# Patient Record
Sex: FEMALE | Race: OTHER | HISPANIC OR LATINO | Employment: OTHER | URBAN - METROPOLITAN AREA
[De-identification: names, ages, dates, MRNs, and addresses within clinical notes are randomized per-mention and may not be internally consistent; named-entity substitution may affect disease eponyms.]

---

## 2024-09-10 ENCOUNTER — HOSPITAL ENCOUNTER (OUTPATIENT)
Dept: RADIOLOGY | Facility: MEDICAL CENTER | Age: 64
End: 2024-09-10
Attending: STUDENT IN AN ORGANIZED HEALTH CARE EDUCATION/TRAINING PROGRAM
Payer: MEDICAID

## 2024-09-10 DIAGNOSIS — M17.9 OSTEOARTHRITIS OF KNEE, UNSPECIFIED: ICD-10-CM

## 2024-09-10 PROCEDURE — 73700 CT LOWER EXTREMITY W/O DYE: CPT | Mod: LT

## 2024-09-30 ENCOUNTER — APPOINTMENT (OUTPATIENT)
Dept: ADMISSIONS | Facility: MEDICAL CENTER | Age: 64
End: 2024-09-30
Attending: STUDENT IN AN ORGANIZED HEALTH CARE EDUCATION/TRAINING PROGRAM
Payer: MEDICAID

## 2024-10-15 ENCOUNTER — PRE-ADMISSION TESTING (OUTPATIENT)
Dept: ADMISSIONS | Facility: MEDICAL CENTER | Age: 64
End: 2024-10-15
Attending: STUDENT IN AN ORGANIZED HEALTH CARE EDUCATION/TRAINING PROGRAM
Payer: MEDICAID

## 2024-10-15 VITALS — HEIGHT: 62 IN

## 2024-10-15 DIAGNOSIS — Z01.812 PRE-OPERATIVE LABORATORY EXAMINATION: ICD-10-CM

## 2024-10-15 DIAGNOSIS — Z01.810 PRE-OPERATIVE CARDIOVASCULAR EXAMINATION: ICD-10-CM

## 2024-10-15 LAB
EKG IMPRESSION: NORMAL
ERYTHROCYTE [DISTWIDTH] IN BLOOD BY AUTOMATED COUNT: 45.5 FL (ref 35.9–50)
HCT VFR BLD AUTO: 41.6 % (ref 37–47)
HGB BLD-MCNC: 14.6 G/DL (ref 12–16)
MCH RBC QN AUTO: 33 PG (ref 27–33)
MCHC RBC AUTO-ENTMCNC: 35.1 G/DL (ref 32.2–35.5)
MCV RBC AUTO: 93.9 FL (ref 81.4–97.8)
PLATELET # BLD AUTO: 192 K/UL (ref 164–446)
PMV BLD AUTO: 11.5 FL (ref 9–12.9)
RBC # BLD AUTO: 4.43 M/UL (ref 4.2–5.4)
SCCMEC + MECA PNL NOSE NAA+PROBE: NEGATIVE
SCCMEC + MECA PNL NOSE NAA+PROBE: NEGATIVE
WBC # BLD AUTO: 6.5 K/UL (ref 4.8–10.8)

## 2024-10-15 PROCEDURE — 93005 ELECTROCARDIOGRAM TRACING: CPT

## 2024-10-15 PROCEDURE — 93010 ELECTROCARDIOGRAM REPORT: CPT | Performed by: INTERNAL MEDICINE

## 2024-10-15 PROCEDURE — 36415 COLL VENOUS BLD VENIPUNCTURE: CPT

## 2024-10-15 PROCEDURE — 87640 STAPH A DNA AMP PROBE: CPT | Mod: XU

## 2024-10-15 PROCEDURE — 87641 MR-STAPH DNA AMP PROBE: CPT

## 2024-10-15 PROCEDURE — 85027 COMPLETE CBC AUTOMATED: CPT

## 2024-10-15 RX ORDER — FUROSEMIDE 20 MG/1
20 TABLET ORAL DAILY
COMMUNITY
Start: 2023-08-01

## 2024-10-15 RX ORDER — ACETAMINOPHEN 500 MG
500-1000 TABLET ORAL EVERY 6 HOURS PRN
COMMUNITY

## 2024-10-15 RX ORDER — TADALAFIL 20 MG/1
40 TABLET ORAL PRN
COMMUNITY
Start: 2023-11-13

## 2024-10-15 RX ORDER — SPIRONOLACTONE 50 MG/1
50 TABLET, FILM COATED ORAL DAILY
COMMUNITY

## 2024-10-15 RX ORDER — LOSARTAN POTASSIUM 25 MG/1
12.5 TABLET ORAL DAILY
COMMUNITY
Start: 2023-03-09

## 2024-10-15 RX ORDER — PANTOPRAZOLE SODIUM 40 MG/1
40 TABLET, DELAYED RELEASE ORAL
COMMUNITY

## 2024-10-15 RX ORDER — ROSUVASTATIN CALCIUM 5 MG/1
5 TABLET, COATED ORAL DAILY
COMMUNITY
Start: 2023-03-09

## 2024-10-15 RX ORDER — MACITENTAN 10 MG/1
10 TABLET, FILM COATED ORAL DAILY
COMMUNITY
Start: 2023-12-28

## 2024-10-28 ENCOUNTER — ANESTHESIA EVENT (OUTPATIENT)
Dept: SURGERY | Facility: MEDICAL CENTER | Age: 64
End: 2024-10-28
Payer: MEDICAID

## 2024-10-28 ENCOUNTER — ANESTHESIA (OUTPATIENT)
Dept: SURGERY | Facility: MEDICAL CENTER | Age: 64
End: 2024-10-28
Payer: MEDICAID

## 2024-10-28 ENCOUNTER — HOSPITAL ENCOUNTER (OUTPATIENT)
Facility: MEDICAL CENTER | Age: 64
End: 2024-10-29
Attending: STUDENT IN AN ORGANIZED HEALTH CARE EDUCATION/TRAINING PROGRAM | Admitting: STUDENT IN AN ORGANIZED HEALTH CARE EDUCATION/TRAINING PROGRAM
Payer: MEDICAID

## 2024-10-28 ENCOUNTER — APPOINTMENT (OUTPATIENT)
Dept: RADIOLOGY | Facility: MEDICAL CENTER | Age: 64
End: 2024-10-28
Attending: STUDENT IN AN ORGANIZED HEALTH CARE EDUCATION/TRAINING PROGRAM
Payer: MEDICAID

## 2024-10-28 DIAGNOSIS — Z96.652 HISTORY OF TOTAL KNEE ARTHROPLASTY, LEFT: ICD-10-CM

## 2024-10-28 PROCEDURE — C1713 ANCHOR/SCREW BN/BN,TIS/BN: HCPCS | Performed by: STUDENT IN AN ORGANIZED HEALTH CARE EDUCATION/TRAINING PROGRAM

## 2024-10-28 PROCEDURE — C1776 JOINT DEVICE (IMPLANTABLE): HCPCS | Performed by: STUDENT IN AN ORGANIZED HEALTH CARE EDUCATION/TRAINING PROGRAM

## 2024-10-28 PROCEDURE — 502000 HCHG MISC OR IMPLANTS RC 0278: Performed by: STUDENT IN AN ORGANIZED HEALTH CARE EDUCATION/TRAINING PROGRAM

## 2024-10-28 PROCEDURE — 700105 HCHG RX REV CODE 258: Performed by: STUDENT IN AN ORGANIZED HEALTH CARE EDUCATION/TRAINING PROGRAM

## 2024-10-28 PROCEDURE — 700101 HCHG RX REV CODE 250: Performed by: STUDENT IN AN ORGANIZED HEALTH CARE EDUCATION/TRAINING PROGRAM

## 2024-10-28 PROCEDURE — 97163 PT EVAL HIGH COMPLEX 45 MIN: CPT

## 2024-10-28 PROCEDURE — 700102 HCHG RX REV CODE 250 W/ 637 OVERRIDE(OP): Performed by: STUDENT IN AN ORGANIZED HEALTH CARE EDUCATION/TRAINING PROGRAM

## 2024-10-28 PROCEDURE — 700111 HCHG RX REV CODE 636 W/ 250 OVERRIDE (IP): Performed by: STUDENT IN AN ORGANIZED HEALTH CARE EDUCATION/TRAINING PROGRAM

## 2024-10-28 PROCEDURE — 64447 NJX AA&/STRD FEMORAL NRV IMG: CPT | Performed by: STUDENT IN AN ORGANIZED HEALTH CARE EDUCATION/TRAINING PROGRAM

## 2024-10-28 PROCEDURE — 160035 HCHG PACU - 1ST 60 MINS PHASE I: Performed by: STUDENT IN AN ORGANIZED HEALTH CARE EDUCATION/TRAINING PROGRAM

## 2024-10-28 PROCEDURE — 700111 HCHG RX REV CODE 636 W/ 250 OVERRIDE (IP): Mod: JZ | Performed by: STUDENT IN AN ORGANIZED HEALTH CARE EDUCATION/TRAINING PROGRAM

## 2024-10-28 PROCEDURE — 160002 HCHG RECOVERY MINUTES (STAT): Performed by: STUDENT IN AN ORGANIZED HEALTH CARE EDUCATION/TRAINING PROGRAM

## 2024-10-28 PROCEDURE — 73560 X-RAY EXAM OF KNEE 1 OR 2: CPT | Mod: LT

## 2024-10-28 PROCEDURE — 97535 SELF CARE MNGMENT TRAINING: CPT

## 2024-10-28 PROCEDURE — A9270 NON-COVERED ITEM OR SERVICE: HCPCS | Performed by: STUDENT IN AN ORGANIZED HEALTH CARE EDUCATION/TRAINING PROGRAM

## 2024-10-28 PROCEDURE — 770030 HCHG ROOM/CARE - EXTENDED RECOVERY EACH 15 MIN

## 2024-10-28 PROCEDURE — 160009 HCHG ANES TIME/MIN: Performed by: STUDENT IN AN ORGANIZED HEALTH CARE EDUCATION/TRAINING PROGRAM

## 2024-10-28 PROCEDURE — 94760 N-INVAS EAR/PLS OXIMETRY 1: CPT

## 2024-10-28 PROCEDURE — 502714 HCHG ROBOTIC SURGERY SERVICES: Performed by: STUDENT IN AN ORGANIZED HEALTH CARE EDUCATION/TRAINING PROGRAM

## 2024-10-28 PROCEDURE — 160031 HCHG SURGERY MINUTES - 1ST 30 MINS LEVEL 5: Performed by: STUDENT IN AN ORGANIZED HEALTH CARE EDUCATION/TRAINING PROGRAM

## 2024-10-28 PROCEDURE — 160036 HCHG PACU - EA ADDL 30 MINS PHASE I: Performed by: STUDENT IN AN ORGANIZED HEALTH CARE EDUCATION/TRAINING PROGRAM

## 2024-10-28 PROCEDURE — 160048 HCHG OR STATISTICAL LEVEL 1-5: Performed by: STUDENT IN AN ORGANIZED HEALTH CARE EDUCATION/TRAINING PROGRAM

## 2024-10-28 PROCEDURE — 160042 HCHG SURGERY MINUTES - EA ADDL 1 MIN LEVEL 5: Performed by: STUDENT IN AN ORGANIZED HEALTH CARE EDUCATION/TRAINING PROGRAM

## 2024-10-28 DEVICE — COMPONENT FEMORAL TRIATHLON CRUCIATE RETAIN LEFT SIZE 3 (1EA): Type: IMPLANTABLE DEVICE | Site: KNEE | Status: FUNCTIONAL

## 2024-10-28 DEVICE — BASEPLATE TIBIAL TRIATHLON TRITANIUM SIZE 2 (1EA): Type: IMPLANTABLE DEVICE | Site: KNEE | Status: FUNCTIONAL

## 2024-10-28 DEVICE — IMPLANTABLE DEVICE: Type: IMPLANTABLE DEVICE | Site: KNEE | Status: FUNCTIONAL

## 2024-10-28 RX ORDER — MORPHINE SULFATE 0.5 MG/ML
INJECTION, SOLUTION EPIDURAL; INTRATHECAL; INTRAVENOUS
Status: DISCONTINUED | OUTPATIENT
Start: 2024-10-28 | End: 2024-10-28 | Stop reason: HOSPADM

## 2024-10-28 RX ORDER — SODIUM PHOSPHATE,MONO-DIBASIC 19G-7G/118
1 ENEMA (ML) RECTAL
Status: DISCONTINUED | OUTPATIENT
Start: 2024-10-28 | End: 2024-10-29 | Stop reason: HOSPADM

## 2024-10-28 RX ORDER — AMOXICILLIN 250 MG
1 CAPSULE ORAL NIGHTLY
Status: DISCONTINUED | OUTPATIENT
Start: 2024-10-28 | End: 2024-10-29 | Stop reason: HOSPADM

## 2024-10-28 RX ORDER — LIDOCAINE HYDROCHLORIDE 20 MG/ML
INJECTION, SOLUTION EPIDURAL; INFILTRATION; INTRACAUDAL; PERINEURAL PRN
Status: DISCONTINUED | OUTPATIENT
Start: 2024-10-28 | End: 2024-10-28 | Stop reason: SURG

## 2024-10-28 RX ORDER — LABETALOL HYDROCHLORIDE 5 MG/ML
5 INJECTION, SOLUTION INTRAVENOUS
Status: DISCONTINUED | OUTPATIENT
Start: 2024-10-28 | End: 2024-10-28 | Stop reason: HOSPADM

## 2024-10-28 RX ORDER — LIDOCAINE HYDROCHLORIDE 40 MG/ML
SOLUTION TOPICAL PRN
Status: DISCONTINUED | OUTPATIENT
Start: 2024-10-28 | End: 2024-10-28 | Stop reason: SURG

## 2024-10-28 RX ORDER — HYDROMORPHONE HYDROCHLORIDE 1 MG/ML
0.4 INJECTION, SOLUTION INTRAMUSCULAR; INTRAVENOUS; SUBCUTANEOUS
Status: DISCONTINUED | OUTPATIENT
Start: 2024-10-28 | End: 2024-10-28 | Stop reason: HOSPADM

## 2024-10-28 RX ORDER — EPINEPHRINE 1 MG/ML(1)
AMPUL (ML) INJECTION
Status: DISCONTINUED | OUTPATIENT
Start: 2024-10-28 | End: 2024-10-28 | Stop reason: HOSPADM

## 2024-10-28 RX ORDER — DEXAMETHASONE SODIUM PHOSPHATE 4 MG/ML
4 INJECTION, SOLUTION INTRA-ARTICULAR; INTRALESIONAL; INTRAMUSCULAR; INTRAVENOUS; SOFT TISSUE
Status: DISCONTINUED | OUTPATIENT
Start: 2024-10-28 | End: 2024-10-29 | Stop reason: HOSPADM

## 2024-10-28 RX ORDER — DOCUSATE SODIUM 100 MG/1
100 CAPSULE, LIQUID FILLED ORAL 2 TIMES DAILY
Status: DISCONTINUED | OUTPATIENT
Start: 2024-10-28 | End: 2024-10-29 | Stop reason: HOSPADM

## 2024-10-28 RX ORDER — BUPIVACAINE HYDROCHLORIDE AND EPINEPHRINE 2.5; 5 MG/ML; UG/ML
INJECTION, SOLUTION EPIDURAL; INFILTRATION; INTRACAUDAL; PERINEURAL
Status: COMPLETED | OUTPATIENT
Start: 2024-10-28 | End: 2024-10-28

## 2024-10-28 RX ORDER — DIPHENHYDRAMINE HYDROCHLORIDE 50 MG/ML
25 INJECTION INTRAMUSCULAR; INTRAVENOUS EVERY 6 HOURS PRN
Status: DISCONTINUED | OUTPATIENT
Start: 2024-10-28 | End: 2024-10-29 | Stop reason: HOSPADM

## 2024-10-28 RX ORDER — OXYCODONE HCL 5 MG/5 ML
5 SOLUTION, ORAL ORAL
Status: COMPLETED | OUTPATIENT
Start: 2024-10-28 | End: 2024-10-28

## 2024-10-28 RX ORDER — ACETAMINOPHEN 500 MG
1000 TABLET ORAL EVERY 6 HOURS PRN
Status: DISCONTINUED | OUTPATIENT
Start: 2024-11-02 | End: 2024-10-29 | Stop reason: HOSPADM

## 2024-10-28 RX ORDER — METOPROLOL TARTRATE 1 MG/ML
1 INJECTION, SOLUTION INTRAVENOUS
Status: DISCONTINUED | OUTPATIENT
Start: 2024-10-28 | End: 2024-10-28 | Stop reason: HOSPADM

## 2024-10-28 RX ORDER — MAGNESIUM SULFATE HEPTAHYDRATE 40 MG/ML
INJECTION, SOLUTION INTRAVENOUS PRN
Status: DISCONTINUED | OUTPATIENT
Start: 2024-10-28 | End: 2024-10-28 | Stop reason: SURG

## 2024-10-28 RX ORDER — SODIUM CHLORIDE, SODIUM LACTATE, POTASSIUM CHLORIDE, CALCIUM CHLORIDE 600; 310; 30; 20 MG/100ML; MG/100ML; MG/100ML; MG/100ML
INJECTION, SOLUTION INTRAVENOUS CONTINUOUS
Status: ACTIVE | OUTPATIENT
Start: 2024-10-28 | End: 2024-10-28

## 2024-10-28 RX ORDER — SODIUM CHLORIDE 9 MG/ML
INJECTION, SOLUTION INTRAMUSCULAR; INTRAVENOUS; SUBCUTANEOUS
Status: DISCONTINUED | OUTPATIENT
Start: 2024-10-28 | End: 2024-10-28 | Stop reason: HOSPADM

## 2024-10-28 RX ORDER — CEFAZOLIN SODIUM 1 G/3ML
INJECTION, POWDER, FOR SOLUTION INTRAMUSCULAR; INTRAVENOUS PRN
Status: DISCONTINUED | OUTPATIENT
Start: 2024-10-28 | End: 2024-10-28 | Stop reason: SURG

## 2024-10-28 RX ORDER — ACETAMINOPHEN 500 MG
1000 TABLET ORAL ONCE
Status: DISCONTINUED | OUTPATIENT
Start: 2024-10-28 | End: 2024-10-28 | Stop reason: HOSPADM

## 2024-10-28 RX ORDER — ACETAMINOPHEN 500 MG
1000 TABLET ORAL EVERY 6 HOURS
Status: DISCONTINUED | OUTPATIENT
Start: 2024-10-28 | End: 2024-10-29 | Stop reason: HOSPADM

## 2024-10-28 RX ORDER — PHENYLEPHRINE HYDROCHLORIDE 10 MG/ML
INJECTION, SOLUTION INTRAMUSCULAR; INTRAVENOUS; SUBCUTANEOUS PRN
Status: DISCONTINUED | OUTPATIENT
Start: 2024-10-28 | End: 2024-10-28 | Stop reason: SURG

## 2024-10-28 RX ORDER — ONDANSETRON 2 MG/ML
INJECTION INTRAMUSCULAR; INTRAVENOUS PRN
Status: DISCONTINUED | OUTPATIENT
Start: 2024-10-28 | End: 2024-10-28 | Stop reason: SURG

## 2024-10-28 RX ORDER — SCOLOPAMINE TRANSDERMAL SYSTEM 1 MG/1
1 PATCH, EXTENDED RELEASE TRANSDERMAL
Status: DISCONTINUED | OUTPATIENT
Start: 2024-10-28 | End: 2024-10-29 | Stop reason: HOSPADM

## 2024-10-28 RX ORDER — HYDRALAZINE HYDROCHLORIDE 20 MG/ML
5 INJECTION INTRAMUSCULAR; INTRAVENOUS
Status: DISCONTINUED | OUTPATIENT
Start: 2024-10-28 | End: 2024-10-28 | Stop reason: HOSPADM

## 2024-10-28 RX ORDER — DIPHENHYDRAMINE HYDROCHLORIDE 50 MG/ML
12.5 INJECTION INTRAMUSCULAR; INTRAVENOUS
Status: DISCONTINUED | OUTPATIENT
Start: 2024-10-28 | End: 2024-10-28 | Stop reason: HOSPADM

## 2024-10-28 RX ORDER — KETOROLAC TROMETHAMINE 30 MG/ML
INJECTION, SOLUTION INTRAMUSCULAR; INTRAVENOUS
Status: DISCONTINUED | OUTPATIENT
Start: 2024-10-28 | End: 2024-10-28 | Stop reason: HOSPADM

## 2024-10-28 RX ORDER — HALOPERIDOL 5 MG/ML
1 INJECTION INTRAMUSCULAR
Status: DISCONTINUED | OUTPATIENT
Start: 2024-10-28 | End: 2024-10-28 | Stop reason: HOSPADM

## 2024-10-28 RX ORDER — IPRATROPIUM BROMIDE AND ALBUTEROL SULFATE 2.5; .5 MG/3ML; MG/3ML
3 SOLUTION RESPIRATORY (INHALATION)
Status: DISCONTINUED | OUTPATIENT
Start: 2024-10-28 | End: 2024-10-28 | Stop reason: HOSPADM

## 2024-10-28 RX ORDER — OXYCODONE HCL 5 MG/5 ML
10 SOLUTION, ORAL ORAL
Status: COMPLETED | OUTPATIENT
Start: 2024-10-28 | End: 2024-10-28

## 2024-10-28 RX ORDER — BISACODYL 10 MG
10 SUPPOSITORY, RECTAL RECTAL
Status: DISCONTINUED | OUTPATIENT
Start: 2024-10-28 | End: 2024-10-29 | Stop reason: HOSPADM

## 2024-10-28 RX ORDER — POLYETHYLENE GLYCOL 3350 17 G/17G
1 POWDER, FOR SOLUTION ORAL 2 TIMES DAILY PRN
Status: DISCONTINUED | OUTPATIENT
Start: 2024-10-28 | End: 2024-10-29 | Stop reason: HOSPADM

## 2024-10-28 RX ORDER — AMOXICILLIN 250 MG
1 CAPSULE ORAL
Status: DISCONTINUED | OUTPATIENT
Start: 2024-10-28 | End: 2024-10-29 | Stop reason: HOSPADM

## 2024-10-28 RX ORDER — ONDANSETRON 2 MG/ML
4 INJECTION INTRAMUSCULAR; INTRAVENOUS
Status: DISCONTINUED | OUTPATIENT
Start: 2024-10-28 | End: 2024-10-28 | Stop reason: HOSPADM

## 2024-10-28 RX ORDER — HYDROMORPHONE HYDROCHLORIDE 1 MG/ML
0.2 INJECTION, SOLUTION INTRAMUSCULAR; INTRAVENOUS; SUBCUTANEOUS
Status: DISCONTINUED | OUTPATIENT
Start: 2024-10-28 | End: 2024-10-28 | Stop reason: HOSPADM

## 2024-10-28 RX ORDER — MIDAZOLAM HYDROCHLORIDE 1 MG/ML
INJECTION INTRAMUSCULAR; INTRAVENOUS PRN
Status: DISCONTINUED | OUTPATIENT
Start: 2024-10-28 | End: 2024-10-28 | Stop reason: SURG

## 2024-10-28 RX ORDER — ROPIVACAINE HYDROCHLORIDE 5 MG/ML
INJECTION, SOLUTION EPIDURAL; INFILTRATION; PERINEURAL
Status: DISCONTINUED | OUTPATIENT
Start: 2024-10-28 | End: 2024-10-28 | Stop reason: HOSPADM

## 2024-10-28 RX ORDER — DEXAMETHASONE SODIUM PHOSPHATE 4 MG/ML
INJECTION, SOLUTION INTRA-ARTICULAR; INTRALESIONAL; INTRAMUSCULAR; INTRAVENOUS; SOFT TISSUE PRN
Status: DISCONTINUED | OUTPATIENT
Start: 2024-10-28 | End: 2024-10-28 | Stop reason: SURG

## 2024-10-28 RX ORDER — HALOPERIDOL 5 MG/ML
1 INJECTION INTRAMUSCULAR EVERY 6 HOURS PRN
Status: DISCONTINUED | OUTPATIENT
Start: 2024-10-28 | End: 2024-10-29 | Stop reason: HOSPADM

## 2024-10-28 RX ORDER — ONDANSETRON 2 MG/ML
4 INJECTION INTRAMUSCULAR; INTRAVENOUS EVERY 4 HOURS PRN
Status: DISCONTINUED | OUTPATIENT
Start: 2024-10-28 | End: 2024-10-29 | Stop reason: HOSPADM

## 2024-10-28 RX ORDER — TRANEXAMIC ACID 100 MG/ML
INJECTION, SOLUTION INTRAVENOUS PRN
Status: DISCONTINUED | OUTPATIENT
Start: 2024-10-28 | End: 2024-10-28 | Stop reason: SURG

## 2024-10-28 RX ORDER — TRAMADOL HYDROCHLORIDE 50 MG/1
50 TABLET ORAL EVERY 4 HOURS PRN
Status: DISCONTINUED | OUTPATIENT
Start: 2024-10-28 | End: 2024-10-29 | Stop reason: HOSPADM

## 2024-10-28 RX ORDER — HYDROMORPHONE HYDROCHLORIDE 2 MG/ML
INJECTION, SOLUTION INTRAMUSCULAR; INTRAVENOUS; SUBCUTANEOUS PRN
Status: DISCONTINUED | OUTPATIENT
Start: 2024-10-28 | End: 2024-10-28 | Stop reason: SURG

## 2024-10-28 RX ORDER — HYDROMORPHONE HYDROCHLORIDE 1 MG/ML
0.1 INJECTION, SOLUTION INTRAMUSCULAR; INTRAVENOUS; SUBCUTANEOUS
Status: DISCONTINUED | OUTPATIENT
Start: 2024-10-28 | End: 2024-10-28 | Stop reason: HOSPADM

## 2024-10-28 RX ORDER — EPHEDRINE SULFATE 50 MG/ML
5 INJECTION, SOLUTION INTRAVENOUS
Status: DISCONTINUED | OUTPATIENT
Start: 2024-10-28 | End: 2024-10-28 | Stop reason: HOSPADM

## 2024-10-28 RX ADMIN — OXYCODONE HYDROCHLORIDE 5 MG: 5 SOLUTION ORAL at 11:32

## 2024-10-28 RX ADMIN — DOCUSATE SODIUM 100 MG: 100 CAPSULE, LIQUID FILLED ORAL at 17:39

## 2024-10-28 RX ADMIN — DEXAMETHASONE SODIUM PHOSPHATE 8 MG: 4 INJECTION INTRA-ARTICULAR; INTRALESIONAL; INTRAMUSCULAR; INTRAVENOUS; SOFT TISSUE at 09:40

## 2024-10-28 RX ADMIN — TRAMADOL HYDROCHLORIDE 50 MG: 50 TABLET ORAL at 15:15

## 2024-10-28 RX ADMIN — BUPIVACAINE HYDROCHLORIDE AND EPINEPHRINE BITARTRATE 20 ML: 2.5; .0091 INJECTION, SOLUTION EPIDURAL; INFILTRATION; INTRACAUDAL; PERINEURAL at 09:20

## 2024-10-28 RX ADMIN — PROPOFOL 50 MG: 10 INJECTION, EMULSION INTRAVENOUS at 09:38

## 2024-10-28 RX ADMIN — LIDOCAINE HYDROCHLORIDE 4 ML: 40 SOLUTION TOPICAL at 09:40

## 2024-10-28 RX ADMIN — SODIUM CHLORIDE, POTASSIUM CHLORIDE, SODIUM LACTATE AND CALCIUM CHLORIDE: 600; 310; 30; 20 INJECTION, SOLUTION INTRAVENOUS at 09:32

## 2024-10-28 RX ADMIN — MIDAZOLAM HYDROCHLORIDE 1 MG: 2 INJECTION, SOLUTION INTRAMUSCULAR; INTRAVENOUS at 09:20

## 2024-10-28 RX ADMIN — ROCURONIUM BROMIDE 50 MG: 10 INJECTION, SOLUTION INTRAVENOUS at 09:38

## 2024-10-28 RX ADMIN — HYDROMORPHONE HYDROCHLORIDE 0.4 MG: 2 INJECTION INTRAMUSCULAR; INTRAVENOUS; SUBCUTANEOUS at 10:05

## 2024-10-28 RX ADMIN — ACETAMINOPHEN 1000 MG: 325 TABLET ORAL at 23:27

## 2024-10-28 RX ADMIN — ONDANSETRON 4 MG: 2 INJECTION INTRAMUSCULAR; INTRAVENOUS at 09:40

## 2024-10-28 RX ADMIN — PHENYLEPHRINE HYDROCHLORIDE 200 MCG: 10 INJECTION INTRAVENOUS at 10:46

## 2024-10-28 RX ADMIN — ACETAMINOPHEN 1000 MG: 325 TABLET ORAL at 15:12

## 2024-10-28 RX ADMIN — PHENYLEPHRINE HYDROCHLORIDE 400 MCG: 10 INJECTION INTRAVENOUS at 10:16

## 2024-10-28 RX ADMIN — MAGNESIUM SULFATE HEPTAHYDRATE 2 G: 4 INJECTION, SOLUTION INTRAVENOUS at 10:03

## 2024-10-28 RX ADMIN — CEFAZOLIN 2 G: 2 INJECTION, POWDER, FOR SOLUTION INTRAMUSCULAR; INTRAVENOUS at 17:11

## 2024-10-28 RX ADMIN — TRANEXAMIC ACID 1000 MG: 100 INJECTION, SOLUTION INTRAVENOUS at 10:47

## 2024-10-28 RX ADMIN — FENTANYL CITRATE 25 MCG: 50 INJECTION, SOLUTION INTRAMUSCULAR; INTRAVENOUS at 11:32

## 2024-10-28 RX ADMIN — FENTANYL CITRATE 25 MCG: 50 INJECTION, SOLUTION INTRAMUSCULAR; INTRAVENOUS at 11:58

## 2024-10-28 RX ADMIN — SUGAMMADEX 200 MG: 100 INJECTION, SOLUTION INTRAVENOUS at 11:06

## 2024-10-28 RX ADMIN — FENTANYL CITRATE 50 MCG: 50 INJECTION, SOLUTION INTRAMUSCULAR; INTRAVENOUS at 09:36

## 2024-10-28 RX ADMIN — LIDOCAINE HYDROCHLORIDE 100 MG: 20 INJECTION, SOLUTION EPIDURAL; INFILTRATION; INTRACAUDAL at 09:38

## 2024-10-28 RX ADMIN — MIDAZOLAM HYDROCHLORIDE 1 MG: 2 INJECTION, SOLUTION INTRAMUSCULAR; INTRAVENOUS at 09:34

## 2024-10-28 RX ADMIN — PHENYLEPHRINE HYDROCHLORIDE 1 MCG/KG/MIN: 10 INJECTION INTRAVENOUS at 09:36

## 2024-10-28 RX ADMIN — TRANEXAMIC ACID 1000 MG: 100 INJECTION, SOLUTION INTRAVENOUS at 09:41

## 2024-10-28 RX ADMIN — SENNOSIDES AND DOCUSATE SODIUM 1 TABLET: 50; 8.6 TABLET ORAL at 21:20

## 2024-10-28 RX ADMIN — CEFAZOLIN 2 G: 1 INJECTION, POWDER, FOR SOLUTION INTRAMUSCULAR; INTRAVENOUS at 09:38

## 2024-10-28 SDOH — ECONOMIC STABILITY: TRANSPORTATION INSECURITY
IN THE PAST 12 MONTHS, HAS LACK OF RELIABLE TRANSPORTATION KEPT YOU FROM MEDICAL APPOINTMENTS, MEETINGS, WORK OR FROM GETTING THINGS NEEDED FOR DAILY LIVING?: NO

## 2024-10-28 SDOH — ECONOMIC STABILITY: TRANSPORTATION INSECURITY
IN THE PAST 12 MONTHS, HAS THE LACK OF TRANSPORTATION KEPT YOU FROM MEDICAL APPOINTMENTS OR FROM GETTING MEDICATIONS?: NO

## 2024-10-28 ASSESSMENT — SOCIAL DETERMINANTS OF HEALTH (SDOH)
WITHIN THE LAST YEAR, HAVE TO BEEN RAPED OR FORCED TO HAVE ANY KIND OF SEXUAL ACTIVITY BY YOUR PARTNER OR EX-PARTNER?: NO
IN THE PAST 12 MONTHS, HAS THE ELECTRIC, GAS, OIL, OR WATER COMPANY THREATENED TO SHUT OFF SERVICE IN YOUR HOME?: NO
WITHIN THE LAST YEAR, HAVE YOU BEEN AFRAID OF YOUR PARTNER OR EX-PARTNER?: NO
WITHIN THE LAST YEAR, HAVE YOU BEEN KICKED, HIT, SLAPPED, OR OTHERWISE PHYSICALLY HURT BY YOUR PARTNER OR EX-PARTNER?: NO
WITHIN THE LAST YEAR, HAVE YOU BEEN HUMILIATED OR EMOTIONALLY ABUSED IN OTHER WAYS BY YOUR PARTNER OR EX-PARTNER?: NO
WITHIN THE PAST 12 MONTHS, YOU WORRIED THAT YOUR FOOD WOULD RUN OUT BEFORE YOU GOT THE MONEY TO BUY MORE: NEVER TRUE
WITHIN THE PAST 12 MONTHS, THE FOOD YOU BOUGHT JUST DIDN'T LAST AND YOU DIDN'T HAVE MONEY TO GET MORE: NEVER TRUE

## 2024-10-28 ASSESSMENT — PAIN DESCRIPTION - PAIN TYPE
TYPE: SURGICAL PAIN
TYPE: CHRONIC PAIN;ACUTE PAIN
TYPE: SURGICAL PAIN
TYPE: SURGICAL PAIN

## 2024-10-28 ASSESSMENT — LIFESTYLE VARIABLES
EVER FELT BAD OR GUILTY ABOUT YOUR DRINKING: NO
HAVE YOU EVER FELT YOU SHOULD CUT DOWN ON YOUR DRINKING: NO
TOTAL SCORE: 0
AVERAGE NUMBER OF DAYS PER WEEK YOU HAVE A DRINK CONTAINING ALCOHOL: 0
DOES PATIENT WANT TO STOP DRINKING: NO
EVER HAD A DRINK FIRST THING IN THE MORNING TO STEADY YOUR NERVES TO GET RID OF A HANGOVER: NO
HOW MANY TIMES IN THE PAST YEAR HAVE YOU HAD 5 OR MORE DRINKS IN A DAY: 0
TOTAL SCORE: 0
CONSUMPTION TOTAL: NEGATIVE
ALCOHOL_USE: NO
HAVE PEOPLE ANNOYED YOU BY CRITICIZING YOUR DRINKING: NO
ON A TYPICAL DAY WHEN YOU DRINK ALCOHOL HOW MANY DRINKS DO YOU HAVE: 0
TOTAL SCORE: 0

## 2024-10-28 ASSESSMENT — COGNITIVE AND FUNCTIONAL STATUS - GENERAL
DAILY ACTIVITIY SCORE: 24
MOBILITY SCORE: 22
WALKING IN HOSPITAL ROOM: A LITTLE
CLIMB 3 TO 5 STEPS WITH RAILING: A LITTLE
SUGGESTED CMS G CODE MODIFIER MOBILITY: CJ
MOBILITY SCORE: 22
WALKING IN HOSPITAL ROOM: A LITTLE
SUGGESTED CMS G CODE MODIFIER DAILY ACTIVITY: CH
SUGGESTED CMS G CODE MODIFIER MOBILITY: CJ
CLIMB 3 TO 5 STEPS WITH RAILING: A LITTLE

## 2024-10-28 ASSESSMENT — PATIENT HEALTH QUESTIONNAIRE - PHQ9
SUM OF ALL RESPONSES TO PHQ9 QUESTIONS 1 AND 2: 0
1. LITTLE INTEREST OR PLEASURE IN DOING THINGS: NOT AT ALL
2. FEELING DOWN, DEPRESSED, IRRITABLE, OR HOPELESS: NOT AT ALL

## 2024-10-28 ASSESSMENT — GAIT ASSESSMENTS
DEVIATION: ANTALGIC
DISTANCE (FEET): 150
GAIT LEVEL OF ASSIST: SUPERVISED
ASSISTIVE DEVICE: FRONT WHEEL WALKER

## 2024-10-28 ASSESSMENT — FIBROSIS 4 INDEX
FIB4 SCORE: 2.56

## 2024-10-28 ASSESSMENT — PAIN SCALES - GENERAL: PAIN_LEVEL: 4

## 2024-10-28 NOTE — OP REPORT
OPERATIVE NOTE     DATE OF PROCEDURE: 10/28/2024           PRE-OP DIAGNOSIS:   Left knee osteoarthritis           POST-OP DIAGNOSIS: same           PROCEDURE:   Left total knee arthroplasty - robot assisted           SURGEON: Miles Pedersen M.D. - Primary           ASSISTANT: micheline wilder, certified first assist    assistant was required for critical portions of the case including patient positioning manipulation possibly graft preparation retraction suture management wound closure as well as other critical portions of the case.  I be unable to perform these portion of the case by myself there were no other certified first assist available    ANESTHESIA: General peripheral nerve block           ESTIMATED BLOOD LOSS: 50cc                  SPECIMENS: None           COMPLICATIONS: None           CONDITION: Stable           OPERATIVE INDICATIONS AND DESCRIPTION OF PROCEDURE:     Implants:  Minh triathlon press fit total knee arthroplasty system  Size 3 press-fit femur  Size 2 press-fit tibia  Size 31 press-fit patella  Size 12 poly    I met the patient in the preoperative holding area.  I had a full discussion with them regarding multiple options for the patient's condition including nonoperative management.  Again today I offered them nonoperative treatment modalities.  Regarding operative options I specifically I outlined left total knee arthroplasty. I discussed some possible complications including bleeding possibly requiring transfusion, infection, neurovascular damage, malunion, nonunion, failure of implants, failure of surgery, chronic pain, need for revision surgery, instability, limb length discrepancy, prolonged rehab, weight bearing restrictions, DVT, PE, MI, stroke and death. After going over risks and benefits making no promises either guaranteed or implied patient elected to proceed.  At this point informed consent was signed.  A surgical marking pen was used to place my initials on the patient's left  knee.    Patient was brought back to the operating room.  They were placed supine on a regular table all bony prominences padded very well to prevent neuropraxia's.  They were secured to the table with a strap.  General anesthesia was introduced.  Alvin/imp positioner was used for for positioning.  At this point a tourniquet was placed and then the lower extremity was prepped in sterile standard fashion.  At this point a timeout was performed with all parties in the room ceasing activity. Informed consent sheet was visible confirming the patient's name date of birth MRN and matched their wristband. Antibiotics were confirmed and the left lower extremity was confirmed as the correct surgical site.  My surgical initials were visible on this extremity.  At this point we were cleared to proceed with the procedure.  1 g of TXA was given just prior to incision.    Exam under anesthesia revealed flexion contracture 5 degrees full flexion    We began by creating a longitudinal incision of the anterior aspect of the knee.  Is approximately 15 cm in length.  Sharp dissection was carried down through the skin Bovie cautery was used to maintain hemostasis at all times.  This point we exposed our extensor mechanism we performed a medial parapatellar approach.  Sharp dissection was carried down we have a small cuff of tendon to repair after the arthrotomy.  The arthrotomy was carried down to bone all the way to the medial aspect of the tibial tubercle.  At this point appropriate releases were done we exposed our joint space.  A small resection of fat pad was performed and the meniscus was removed.  We released the ACL.    At this point I placed our tibial array making small stab incisions.  I placed our femoral array intra incision within the femur.  All arrays had bicortical pin placement.  Placed our 2 checkpoints.  Registered our [employer] robot.  At this point I then removed osteophytes and I made intraoperative implant  adjustments virtually to achieve appropriate balance.  Once we achieved appropriate balance the robot was brought in.  Taking great care to protect the collateral ligaments I made our distal femoral cuts then our chamfers and anterior posterior cuts on the femur.  I made our tibial cut while protecting all collateral ligaments and patellar tendon.  Bone fragments were removed.  I placed our trial components which were malleted into place.  I used the Bovie to giorgi our tibial rotation after pinning it.  I placed a trial poly which I templated for and took it through range of motion.  Demonstrated excellent full motion good stability with appropriate gapping were very happy with this construct.  I drilled our lugs punched our tibia removed our trials.  This point the wound was copiously irrigated.  I press-fit  our final implants took it through final range of motion demonstrated excellent stability motion and were very happy with this construct.  I then removed our 2 checkpoints as well as our tibial and femoral array.    I turned our attention to the patella.  Using a fan blade saw patella was cut.  Leaving over 13 mm of bone left.  It was drilled for size 31 press-fit patella.  Was irrigated patellas press-fit into place tracking normal along trochlea    This point I did a Betadine wash for 5 minutes.  At this point copious irrigation 1 to 2 L with pulse lavage was used for irrigation.  I placed our posterior capsular injection after aspirating to make sure we are not in any vascular structures.  At this point any remaining bleeders were cauterized.  1 g vancomycin was placed deep within the wound.  During closure second gram TXA was given.  Our extensor mechanism was closed using several nonabsorbable sutures and then a barbed running suture.  Our skin was closed in interrupted subcutaneous/running subcuticular fashion.  Sterile bandages were placed.  Counts were correct x2 patient was woken up from general  anesthesia taken to PACU in stable condition    POSTOPERATIVE PLAN:  Weight-bearing: Weight-bear as tolerated  DVT prophylaxis: Aspirin 81 twice daily for 6 weeks  Antibiotics: Preop as well as 24 hours postop  Showering: Okay to shower postop day 7 and remove dressing, no lotions no scrubbing's, place new dressing.  No tub soaks baths or swimming  Prescriptions: have been E prescribed  Follow-up:  in 2 weeks.  Call with any questions or concerns patient as well as his significant other have been counseled on signs of infection and to call immediately if they develop any of these  Physical Therapy: Begin within 1 week    Did call the patient's family to discuss the surgery with them.  Counseled them on signs of infection which they understood.  If they have any issues including any concerns with infection including redness drainage swelling pain they are to call our office immediately or go to the local emergency department    This is dictated with voice recognition software please excuse any errors

## 2024-10-28 NOTE — OR NURSING
1114 - Pt to PACU from OR. Report from anesthesia and OR RN. On 6L O2 via mask. Respirations even and unlabored. VSS. Pillow placed under operative lower extremity ankle only, knee of jayshree flat.    1132 - Pt medicated for pain per MAR. Pt tolerating sips of water.    1144 - Report to Cecilia AN    1158- Subsequent dose of pain med per MAR.    1214 - Given IS and instructed on use with goal of 2500, pt currently inhaling volumes of approx 2000.     1230 - Unable to wean pt off of O2.     1242 - Pt family updated on pt status by telephone.    1311 -  Report given to Chucky An GSU. All questions answered, verbalizes understanding.     1347 - Transport request placed. O2 tank half full.    1351 - Pt transported to room on 2L O2 via nasal cannula. All belongings with pt.

## 2024-10-28 NOTE — OR NURSING
1150: Report received from Niru ANAYA.     1155: Pt states pain remains 6/10, medicated per prn orders. Stable on 4L mask.     1200: Updated pt family via phone. Pt sleeping at this time. Placed on 2L NC.    1208: Pt on 3L NC. Report given to Niru ANAYA.

## 2024-10-28 NOTE — THERAPY
Physical Therapy   Initial Evaluation     Patient Name: Leeanna Bryant  Age:  63 y.o., Sex:  female  Medical Record #: [id number]  Today's Date: 10/28/2024     Precautions  Precautions: (P) Weight Bearing As Tolerated Left Lower Extremity    Assessment  Patient is 63 y.o. female who was seen s/p left TKA with WBAT orders for left lower extremity. Pt was agreeable to therapy evaluation and presented with safe upright functional mobility with AD use after therapy session. Pt was provided with education on elevation, icing, positioning, and supine/seated therapeutic exercises. Pt was able to return demo safe gait mechanics with use of AD on flat level surfaces with appropriate heel to toe gait mechanics. Pt was able to return demo safe mechanics in order to navigate stairs with use of FWW. Pt was able to demonstrate safe use of AD during transfers/transitions and general locomotion with no olvin LOB. Pt was primarily limited by lightheadedness and drop in SpO2 while on supplemental 3 L O2 down to 88%. Pt was able to recover with deep breathing and rest breaks. RN aware of concerns with SpO2. Pt has no acute skilled PT needs at this time, anticipate pt to d/c home once medically clear with recs for FWW use and OP therapy services.     The pt was provided with the following skilled therapy txt: Pt was provided with VC, demo, and facilitation during gait training in order to return demo terminal knee extension and appropriate heel strike/flexion of knee in order to return demo appropriate heel to toe gait mechanics. Pt was provided with demo and VC to go up/down steps with safe mechanics with use of AD. Pt was provided with VC and TC for appropriate quad contraction and mechanics during supine/seated therapeutic exercises. Pt was provided with education on intensity, frequency, and duration of exercises.    Plan    Physical Therapy Initial Treatment Plan   Duration: (P) Evaluation only    DC Equipment Recommendations: (P)  Front-Wheel Walker  Discharge Recommendations: (P) Recommend outpatient physical therapy services to address higher level deficits     Objective       10/28/24 6074   Initial Contact Note    Initial Contact Note Order Received and Verified, Evaluation Only - Patient Does Not Require Further Acute Physical Therapy at this Time.  However, May Benefit from Post Acute Therapy for Higher Level Functional Deficits.   Precautions   Precautions Weight Bearing As Tolerated Left Lower Extremity   Vitals   Pulse Oximetry 88 %   O2 (LPM) 3   O2 Delivery Device Nasal Cannula   Vitals Comments during ambulation drops to 88%   Pain 0 - 10 Group   Location Knee   Location Orientation Left   Description Sore   Therapist Pain Assessment Prior to Activity;During Activity;Post Activity;Nurse Notified;4   Prior Living Situation   Prior Services None   Housing / Facility 1 Story House   Steps Into Home 1   Steps In Home 0   Equipment Owned None   Lives with - Patient's Self Care Capacity Significant Other   Comments SO is supportive and can assist upon d/c to home   Prior Level of Functional Mobility   Bed Mobility Independent   Transfer Status Independent   Ambulation Independent   Ambulation Distance   (community)   Assistive Devices Used None   Stairs Independent   History of Falls   History of Falls No   Cognition    Cognition / Consciousness WDL   Passive ROM Upper Body   Passive ROM Upper Body WDL   Active ROM Upper Body   Active ROM Upper Body  WDL   Strength Upper Body   Upper Body Strength  WDL   Sensation Upper Body   Upper Extremity Sensation  WDL   Upper Body Muscle Tone   Upper Body Muscle Tone  WDL   Passive ROM Lower Body   Passive ROM Lower Body X   Comments limited due to pain, able to demo 0-90 deg of L knee ROM   Active ROM Lower Body    Active ROM Lower Body  X   Comments same as above   Strength Lower Body   Lower Body Strength  X   Comments limited due to pain, able to demo functional strength for B LE   Sensation  Lower Body   Lower Extremity Sensation   WDL   Lower Body Muscle Tone   Lower Body Muscle Tone  WDL   Neurological Concerns   Neurological Concerns No   Coordination Upper Body   Coordination WDL   Coordination Lower Body    Coordination Lower Body  WDL   Balance Assessment   Sitting Balance (Static) Good   Sitting Balance (Dynamic) Fair +   Standing Balance (Static) Good   Standing Balance (Dynamic) Fair +   Weight Shift Sitting Good   Weight Shift Standing Fair   Comments w/fww use, no olvin LOB noted   Bed Mobility    Supine to Sit Supervised   Sit to Supine Supervised   Scooting Supervised   Gait Analysis   Gait Level Of Assist Supervised   Assistive Device Front Wheel Walker   Distance (Feet) 150   # of Times Distance was Traveled 1   Deviation Antalgic   # of Stairs Climbed 2   Level of Assist with Stairs Supervised   Weight Bearing Status WBAT L LE   Functional Mobility   Sit to Stand Supervised   Bed, Chair, Wheelchair Transfer Supervised   Transfer Method Stand Step   Mobility sit<>stand, ambulation, stairs, transfer back to chair   6 Clicks Assessment - How much HELP from from another person do you currently need... (If the patient hasn't done an activity recently, how much help from another person do you think he/she would need if he/she tried?)   Turning from your back to your side while in a flat bed without using bedrails? 4   Moving from lying on your back to sitting on the side of a flat bed without using bedrails? 4   Moving to and from a bed to a chair (including a wheelchair)? 4   Standing up from a chair using your arms (e.g., wheelchair, or bedside chair)? 4   Walking in hospital room? 3   Climbing 3-5 steps with a railing? 3   6 clicks Mobility Score 22   Activity Tolerance   Sitting in Chair functional   Sitting Edge of Bed NT   Standing 10 mins   Comments reports of slight lightheadedness during ambulation, demonstrates with drop in SpO2, RN aware   Edema / Skin Assessment   Edema / Skin  Not  Assessed   Education Group   Education Provided Role of Physical Therapist;Exercises - Supine;Exercises - Seated;Gait Training;Stair Training;Use of Assistive Device   Role of Physical Therapist Patient Response Patient;Acceptance;Explanation;Demonstration;Verbal Demonstration;Action Demonstration   Gait Training Patient Response Patient;Acceptance;Explanation;Demonstration;Verbal Demonstration;Action Demonstration   Stair Training Patient Response Patient;Acceptance;Explanation;Demonstration;Verbal Demonstration;Action Demonstration   Use of Assistive Device Patient Response Patient;Acceptance;Explanation;Demonstration;Verbal Demonstration;Action Demonstration   Exercises - Supine Patient Response Patient;Acceptance;Explanation;Demonstration;Handout;Action Demonstration;Verbal Demonstration   Exercises - Seated Patient Response Patient;Acceptance;Explanation;Demonstration;Handout;Verbal Demonstration;Action Demonstration   Physical Therapy Initial Treatment Plan    Duration Evaluation only   Anticipated Discharge Equipment and Recommendations   DC Equipment Recommendations Front-Wheel Walker   Discharge Recommendations Recommend outpatient physical therapy services to address higher level deficits   Interdisciplinary Plan of Care Collaboration   IDT Collaboration with  Nursing   Patient Position at End of Therapy Seated;Call Light within Reach;Phone within Reach;Tray Table within Reach;Family / Friend in Room   Collaboration Comments aware of visit and recs   Session Information   Date / Session Number  10/28 eval only

## 2024-10-29 VITALS
BODY MASS INDEX: 22.35 KG/M2 | HEART RATE: 70 BPM | TEMPERATURE: 97 F | WEIGHT: 121.47 LBS | RESPIRATION RATE: 16 BRPM | HEIGHT: 62 IN | DIASTOLIC BLOOD PRESSURE: 61 MMHG | SYSTOLIC BLOOD PRESSURE: 101 MMHG | OXYGEN SATURATION: 93 %

## 2024-10-29 PROCEDURE — 700102 HCHG RX REV CODE 250 W/ 637 OVERRIDE(OP): Performed by: STUDENT IN AN ORGANIZED HEALTH CARE EDUCATION/TRAINING PROGRAM

## 2024-10-29 PROCEDURE — 90471 IMMUNIZATION ADMIN: CPT

## 2024-10-29 PROCEDURE — A9270 NON-COVERED ITEM OR SERVICE: HCPCS | Performed by: STUDENT IN AN ORGANIZED HEALTH CARE EDUCATION/TRAINING PROGRAM

## 2024-10-29 PROCEDURE — 90656 IIV3 VACC NO PRSV 0.5 ML IM: CPT | Performed by: STUDENT IN AN ORGANIZED HEALTH CARE EDUCATION/TRAINING PROGRAM

## 2024-10-29 PROCEDURE — 700111 HCHG RX REV CODE 636 W/ 250 OVERRIDE (IP): Mod: JZ | Performed by: STUDENT IN AN ORGANIZED HEALTH CARE EDUCATION/TRAINING PROGRAM

## 2024-10-29 PROCEDURE — 94760 N-INVAS EAR/PLS OXIMETRY 1: CPT

## 2024-10-29 PROCEDURE — 770030 HCHG ROOM/CARE - EXTENDED RECOVERY EACH 15 MIN

## 2024-10-29 RX ADMIN — ACETAMINOPHEN 1000 MG: 325 TABLET ORAL at 05:21

## 2024-10-29 RX ADMIN — DOCUSATE SODIUM 100 MG: 100 CAPSULE, LIQUID FILLED ORAL at 05:22

## 2024-10-29 RX ADMIN — ONDANSETRON 4 MG: 2 INJECTION INTRAMUSCULAR; INTRAVENOUS at 05:20

## 2024-10-29 RX ADMIN — ACETAMINOPHEN 1000 MG: 325 TABLET ORAL at 11:10

## 2024-10-29 RX ADMIN — INFLUENZA A VIRUS A/VICTORIA/4897/2022 IVR-238 (H1N1) ANTIGEN (FORMALDEHYDE INACTIVATED), INFLUENZA A VIRUS A/CALIFORNIA/122/2022 SAN-022 (H3N2) ANTIGEN (FORMALDEHYDE INACTIVATED), AND INFLUENZA B VIRUS B/MICHIGAN/01/2021 ANTIGEN (FORMALDEHYDE INACTIVATED) 0.5 ML: 15; 15; 15 INJECTION, SUSPENSION INTRAMUSCULAR at 09:05

## 2024-10-29 ASSESSMENT — PAIN DESCRIPTION - PAIN TYPE
TYPE: SURGICAL PAIN
TYPE: SURGICAL PAIN

## 2024-10-29 NOTE — PROGRESS NOTES
Received report from night shift RN.   Assumed care of pt.   Assessment and chart check complete.   Pt is AAOX4, respirations even and unlabored, no signs of distress, denies nausea/vomiting.   Upated for the POC of the day.  Landon vac dressing CDI with dorsiflex and plantar flex both feet, palpable pulses on both LE.   CMS intact.  Encouraged IS.  Fall precautions in place, treaded socks on pt, bed in low position.   Call light within reach.   Educated pt to call if needing anything.   Hourly rounding.

## 2024-10-29 NOTE — CARE PLAN
The patient is Stable - Low risk of patient condition declining or worsening    Shift Goals  Clinical Goals: Monitor BP and oxygen saturation  Patient Goals: monitor BP and sleep comfortably    Progress made toward(s) clinical / shift goals:  Patient Bp is closely monitored, and Spo2 maintained between 92 and 93 on 3L of oxygen. Patient is sleeping at this time , calm an distress .    Patient is not progressing towards the following goals:

## 2024-10-29 NOTE — PROGRESS NOTES
Bedside report received from day shift nurse. Assumed patient care at 1910, Patient is assessed, is AxOx4.Patient is on 3L of oxygen at Spo2 92.Saline locked. Patient made no complaints at this time , calm  and no distress. Bed locked and in low position. Call light and personal belongings within patient reach. Hourly rounding and ongoing nursing care continues.

## 2024-10-29 NOTE — PROGRESS NOTES
Discharging patient home per MD order. Pt demonstrated understanding of discharge instructions, follow up appointments, home medications, prescriptions, and home care for surgical wound. Pt is voiding without difficulty, pain well controlled, tolerating oral medications, O2 greater than 90%.Pt verbalized understanding of discharge instructions and educational handouts and all questions answered. PIV removed. FWW delivered at bedside. Pt discharged off unit with hospital escort.

## 2024-10-29 NOTE — DISCHARGE PLANNING
Received Choice Form at: 1147  Agency/Facility Name: [facility] Referral per Choice Form at: 1239

## 2024-10-29 NOTE — DISCHARGE PLANNING
Case Management Discharge Planning    Admission Date: 10/28/2024  GMLOS:    ALOS: 0    6-Clicks ADL Score: 24  6-Clicks Mobility Score: 22      Anticipated Discharge Dispo: Discharge Disposition: Discharged to home/self care (01)  Discharge Address: [address] [place]    DME Needed: Yes    DME Ordered: Yes    Action(s) Taken: DC Assessment Complete (See below), Choice obtained, and Referral(s) sent    LSW received DME order for FWW. Choice form completed for [facility]. Choice form faxed to [facility].     Escalations Completed: None    Medically Clear: Yes    Next Steps: LSW to follow    Barriers to Discharge: None    Is the patient up for discharge tomorrow: No          Care Transition Team Assessment    Pt lives in a one story house with her spouse.   Pt was independent prior to admission, no DME at baseline. Pt is retired.     Information Source  Orientation Level: Oriented X4  Information Given By: Patient  Who is responsible for making decisions for patient? : Patient    Readmission Evaluation  Is this a readmission?: No    Elopement Risk  Legal Hold: No  Ambulatory or Self Mobile in Wheelchair: Yes  Disoriented: No  Psychiatric Symptoms: None  History of Wandering: No  Elopement this Admit: No  Vocalizing Wanting to Leave: No  Displays Behaviors, Body Language Wanting to Leave: No-Not at Risk for Elopement  Elopement Risk: Not at Risk for Elopement    Interdisciplinary Discharge Planning  Lives with - Patient's Self Care Capacity: Significant Other  Patient or legal guardian wants to designate a caregiver: No  Support Systems: Spouse / Significant Other  Housing / Facility: 1 Story House  Prior Services: None    Discharge Preparedness  What is your plan after discharge?: Home with help  What are your discharge supports?: Spouse  Prior Functional Level: Ambulatory, Independent with Activities of Daily Living  Difficulity with ADLs: None  Difficulity with IADLs: None    Functional Assesment  Prior  Functional Level: Ambulatory, Independent with Activities of Daily Living    Finances  Financial Barriers to Discharge: No  Prescription Coverage: Yes    Vision / Hearing Impairment  Vision Impairment : Yes  Hearing Impairment : No    Advance Directive  Advance Directive?: None    Domestic Abuse  Possible Abuse/Neglect Reported to:: Not Applicable    Psychological Assessment  History of Substance Abuse: None  History of Psychiatric Problems: No  Non-compliant with Treatment: No  Newly Diagnosed Illness: No    Discharge Risks or Barriers  Discharge risks or barriers?: No    Anticipated Discharge Information  Discharge Disposition: Discharged to home/self care (01)  Discharge Address: [address]

## 2024-10-29 NOTE — CARE PLAN
The patient is Stable - Low risk of patient condition declining or worsening    Shift Goals  Clinical Goals: Manage pain at tolerable level throughout the shift, able to ambulate  Patient Goals: pain control, for discharge    Progress made toward(s) clinical / shift goals: Pain has been controlled with scheduled pain medications. Pt able to ambulate using a FWW with assist.     Patient is not progressing towards the following goals:      Problem: Post-Operative Knee Replacement  Goal: Patient will demonstrate understanding of knee replacement post-surgical weight bearing restrictions and DME usage during hospital stay and post discharge  Outcome: Progressing  Goal: Patient's neurovascular status will be maintained or improve  Outcome: Progressing  Goal: Early mobilization post surgery  Outcome: Progressing     Problem: Pain - Post Surgery  Goal: Alleviation or reduction of pain post surgery  Outcome: Progressing     Problem: Incision Care  Goal: Optimal post surgical incision care  Outcome: Progressing     Problem: Respiratory/Oxygenation Function Post-Surgical  Goal: Patient will achieve/maintain normal respiratory rate/effort  Outcome: Progressing     Problem: Venous Thromboembolism (VTE) Prevention  Goal: The patient will remain free from venous thromboembolism (VTE)  Outcome: Progressing     Problem: Discharge Barriers/Planning  Goal: Patient's continuum of care needs will be met  Outcome: Progressing

## 2024-10-29 NOTE — CARE PLAN
The patient is Stable - Low risk of patient condition declining or worsening    Shift Goals  Clinical Goals: Patient will remain free from falls throughout shift.  Patient Goals: rest/sleep comfortably.    Progress made toward(s) clinical / shift goals:  Patient remained free from falls throughout shift with fall precautions including bed in locked and lowest position, call light within reach, bed nearest nurses station.    Patient is not progressing towards the following goals:

## 2024-10-29 NOTE — PROGRESS NOTES
4 Eyes Skin Assessment Completed by Cici RN and Thomas RN.    Head WDL  Ears WDL  Nose WDL  Mouth WDL  Neck WDL  Breast/Chest WDL  Shoulder Blades WDL  Spine WDL  (R) Arm/Elbow/Hand WDL  (L) Arm/Elbow/Hand WDL  Abdomen old Scar from surgery  Groin WDL  Scrotum/Coccyx/Buttocks WDL  (R) Leg WDL  (L) Leg Scar  (R) Heel/Foot/Toe WDL  (L) Heel/Foot/Toe WDL          Devices In Places Blood Pressure Cuff, Pulse Ox, and Nasal Cannula      Interventions In Place Pillows, SCD    Possible Skin Injury No    Pictures Uploaded Into Epic N/A  Wound Consult Placed N/A  RN Wound Prevention Protocol Ordered No

## 2024-11-14 NOTE — PROGRESS NOTES
"Orthopaedic Progress Note  Admitted to floor postoperatively for left total knee arthroplasty      Reports pain controlled  No mari catheter, voiding well per nursing records  Denies fevers, chills, chest pain per nursing records          Examination:   /61   Pulse 70   Temp 36.1 °C (97 °F) (Temporal)   Resp 16   Ht 1.575 m (5' 2.01\")   Wt 55.1 kg (121 lb 7.6 oz)   SpO2 93%     No acute distress  Breathing unlabored  Dressing clean dry and intact   Motor: fires ehl, tibialis anterior, gastrocnemius  Sensation intact to light touch sural, saphenous, superficial peroneal, deep peroneal, tibial distributions  Compartments soft/compressible  Toes warm and well perfused, cap refill <2      A/P: Postop day 1 left total knee arthroplasty    DVT Prophylaxis- TEDS/SCDs, aspirin  Weight Bearing Status-as tolerated  PT/OT  Antibiotics: Preop  Discharged  Case Coordination   "

## (undated) DEVICE — DISPOSABLE WOUND VAC PICO 10 X 30 CM - WOUND CARE (3/CA)

## (undated) DEVICE — PIN FIXATION BONE STERILE 3.2MM X 140MM (2EA/PK)

## (undated) DEVICE — SUTURE 3-0 ETHILON FS-1 - (36/BX) 30 INCH

## (undated) DEVICE — BANDAGE ELASTIC DOUBLE 6X10 - (6EA/BX)"

## (undated) DEVICE — STAPLER SKIN DISP - (6/BX 10BX/CA) VISISTAT

## (undated) DEVICE — SET EXTENSION WITH 2 PORTS (48EA/CA) ***PART #2C8610 IS A SUBSTITUTE*****

## (undated) DEVICE — Device

## (undated) DEVICE — COVER LIGHT HANDLE FLEXIBLE - SOFT (2EA/PK 80PK/CA)

## (undated) DEVICE — TIP INTPLS HFLO ML ORFC BTRY - (12/CS) FOR SURGILAV

## (undated) DEVICE — TUBING CLEARLINK DUO-VENT - C-FLO (48EA/CA)

## (undated) DEVICE — SYSTEM NAVIGATION VIZADISC KNEE PROCEDURE TRACKING KIT (1EA)

## (undated) DEVICE — SODIUM CHL IRRIGATION 0.9% 1000ML (12EA/CA)

## (undated) DEVICE — STOCKINETTE IMPERVIOUS 12X48 - STERILELF (10/CA)"

## (undated) DEVICE — DRAPE IOBAN LARGE 2 INCISE FILM (5EA/CA)

## (undated) DEVICE — GLOVE BIOGEL INDICATOR SZ 8 SURGICAL PF LTX - (50/BX 4BX/CA)

## (undated) DEVICE — LENS/HOOD FOR SPACESUIT - (32/PK) PEEL AWAY FACE

## (undated) DEVICE — GLOVE SURGICAL PROTEXIS PI 8.0 LF - (50PR/BX)

## (undated) DEVICE — TOWEL STOP TIMEOUT SAFETY FLAG (40EA/CA)

## (undated) DEVICE — PIN FIXATION BONE STERILE 3.2MM X 110MM (2EA/PK)

## (undated) DEVICE — HUMID-VENT HEAT AND MOISTURE EXCHANGE- (50/BX)

## (undated) DEVICE — SUTURE 2-0 MONOCRYL PLUS UNDYED CT-1 1 X 36 (36EA/BX)"

## (undated) DEVICE — SUTURE 1 VICRYL PLUS CTX - 8 X 18 INCH (12/BX)

## (undated) DEVICE — PADDING CAST 6 IN STERILE - 6 X 4 YDS (24/CA)

## (undated) DEVICE — SODIUM CHL. IRRIGATION 0.9% 3000ML (4EA/CA 65CA/PF)

## (undated) DEVICE — DRESSING AQUACEL AG ADVANTAGE 3.5 X 10" (10EA/BX)"

## (undated) DEVICE — LACTATED RINGERS INJ 1000 ML - (14EA/CA 60CA/PF)

## (undated) DEVICE — MIXER BONE CEMENT REVOLUTION - W/FEMORAL PRESSURIZER (6/CA)

## (undated) DEVICE — BLADE SAGITTAL SAW DUAL CUT 25.0 X 90.0 X 1.27MM (1/EA)

## (undated) DEVICE — SUCTION INSTRUMENT YANKAUER BULBOUS TIP W/O VENT (50EA/CA)

## (undated) DEVICE — SENSOR OXIMETER ADULT SPO2 RD SET (20EA/BX)

## (undated) DEVICE — PAD PREP 24 X 48 CUFFED - (100/CA)

## (undated) DEVICE — BLADE 90X18X1.27MM SAW SAGITTAL

## (undated) DEVICE — CANISTER SUCTION RIGID RED 1500CC (40EA/CA)

## (undated) DEVICE — HANDPIECE 10FT INTPLS SCT PLS IRRIGATION HAND CONTROL SET (6/PK)

## (undated) DEVICE — PACK TOTAL KNEE (1/CA)

## (undated) DEVICE — KIT DRAPE RIO ONE PIECE WITH POCKETS(10EA MINIMUM ORDER)  (1EA)